# Patient Record
Sex: MALE | Race: WHITE | NOT HISPANIC OR LATINO | URBAN - METROPOLITAN AREA
[De-identification: names, ages, dates, MRNs, and addresses within clinical notes are randomized per-mention and may not be internally consistent; named-entity substitution may affect disease eponyms.]

---

## 2018-11-20 PROBLEM — Z00.00 ENCOUNTER FOR PREVENTIVE HEALTH EXAMINATION: Status: ACTIVE | Noted: 2018-11-20

## 2020-08-13 ENCOUNTER — EMERGENCY (EMERGENCY)
Facility: HOSPITAL | Age: 35
LOS: 1 days | Discharge: HOME | End: 2020-08-13
Attending: EMERGENCY MEDICINE
Payer: MEDICAID

## 2020-08-13 VITALS
HEART RATE: 104 BPM | OXYGEN SATURATION: 99 % | SYSTOLIC BLOOD PRESSURE: 135 MMHG | HEIGHT: 69 IN | WEIGHT: 240.08 LBS | RESPIRATION RATE: 18 BRPM | TEMPERATURE: 98 F | DIASTOLIC BLOOD PRESSURE: 85 MMHG

## 2020-08-13 VITALS — HEART RATE: 96 BPM

## 2020-08-13 PROCEDURE — 71045 X-RAY EXAM CHEST 1 VIEW: CPT | Mod: 26

## 2020-08-13 PROCEDURE — 93010 ELECTROCARDIOGRAM REPORT: CPT

## 2020-08-13 PROCEDURE — 99283 EMERGENCY DEPT VISIT LOW MDM: CPT

## 2020-08-13 PROCEDURE — 99284 EMERGENCY DEPT VISIT MOD MDM: CPT

## 2020-08-13 PROCEDURE — 71045 X-RAY EXAM CHEST 1 VIEW: CPT

## 2020-08-13 PROCEDURE — 93005 ELECTROCARDIOGRAM TRACING: CPT

## 2020-08-13 RX ORDER — FAMOTIDINE 10 MG/ML
20 INJECTION INTRAVENOUS DAILY
Refills: 0 | Status: DISCONTINUED | OUTPATIENT
Start: 2020-08-13 | End: 2020-08-18

## 2020-08-13 RX ADMIN — Medication 30 MILLILITER(S): at 03:08

## 2020-08-13 RX ADMIN — FAMOTIDINE 20 MILLIGRAM(S): 10 INJECTION INTRAVENOUS at 03:08

## 2020-08-13 NOTE — ED PROVIDER NOTE - PHYSICAL EXAMINATION
General: In NAD, non-toxic/well-appearing; well nourished/developed.  Skin: No rashes or lesions. Warm, dry, color normal for race.   Eyes: Sclera anicteric, conjunctivae clear b/l. No discharge. PERRLA, EOMI. No nystagmus.    Throat: Moist mucus membranes.  Neck: Supple, FROM.   Cardio: No lifts, heaves, visible pulsations. No thrills. Rate and rhythm regular, S1 & S2 clear. No audible murmur, gallop, or rub.   PV: No edema of feet/ankles. No calf swelling/tenderness. Pulses: b/l 2+ radial, DP, PT. Capillary refill <2 seconds.  Resp: Speaking in full sentences. No visible nasal flaring, retractions, or deformity. Normal AP to lateral diameter, symmetrical excursion b/l. No chest wall tenderness. Breath sounds vesicular, symmetrical and without rales, rhonchi or wheezing b/l.  Abd: Non-distended. Soft, non-tender, no masses palpated. No rebound, guarding. N  MSK/Neuro: A&Ox3. CN II-XII grossly intact. FROM. Strength 5/5 upper and lower extremities. Sensation intact to bilateral upper and lower extremities. Negative Romberg and pronator drift. Normal point-to-point test/heel-to-shin. Normal gait including tandem, heel and toe walking.

## 2020-08-13 NOTE — ED PROVIDER NOTE - ATTENDING CONTRIBUTION TO CARE
36 yo M presents to ED L arm numbness and tingling with GERD s/s which started after tasking Benadryl to help him fall asleep.  No assoc CP, SOB, N/V, diaphoresis or focal weakness.  Pt's mother with hx of MS.  On exam awake and alert in NAD, PERRL, throat clear mm moist, Neck supple, Cor Reg, Lungs clear, Abd soft, NT, Ext FROM, no edema, Neuro no focal, CN 2-12 intact, MS 5/5 b/l UE/LE's. EKG NSR.  Will re-eval after meds

## 2020-08-13 NOTE — ED PROVIDER NOTE - OBJECTIVE STATEMENT
36 yo male no PMHx presents to ED c/o left arm tingling associated with "indigestion" x 1.5 hours ago. Symptoms began while in bed after taking Benadryl to help sleep. Left arm feels "weak" and "burn." Also burning sensation. Today had chicken parm hero earlier in day and french fries this evening. Uncle with MI at age 45, mother with MS.   Denies ETOH/tobaco use, fmhx SCD, hx blood clots, recent travel, recent illness, sick contacts, COVID contacts, IVDU, fever/chills, rash, headache, chest pain, leg pain/swelling, cough, SOB, abdominal pain, n/v/d/c, back pain. 34 yo male no PMHx presents to ED c/o left arm tingling associated with "indigestion" x 1.5 hours ago. Symptoms began while in bed after taking Benadryl to help sleep. Left arm feels "weak" and "tingling." Also burning sensation to sternum. Today had chicken parm hero earlier in day and french fries this evening. Uncle with MI at age 45, mother with MS. No further complaints at this time.   Denies ETOH/tobaco use, fmhx SCD, hx blood clots, recent travel, recent illness, sick contacts, COVID contacts, IVDU, fever/chills, rash, headache, chest pain, leg pain/swelling, cough, SOB, abdominal pain, n/v/d/c, back pain. 36 yo male no PMHx presents to ED c/o left arm tingling and "indigestion" x 1.5 hours ago. Symptoms began while in bed after taking Benadryl to help sleep. Left arm feels "weak" and "tingling." Also burning sensation to sternum. Today had chicken parm hero earlier in day and french fries this evening. Uncle with MI at age 45, mother with MS. No further complaints at this time.   Denies ETOH/tobaco use, fmhx SCD, hx blood clots, recent travel, recent illness, sick contacts, COVID contacts, IVDU, fever/chills, rash, headache, chest pain, leg pain/swelling, cough, SOB, abdominal pain, n/v/d/c, back pain.

## 2020-08-13 NOTE — ED PROVIDER NOTE - NS ED ATTENDING STATEMENT MOD
I have personally performed a face to face diagnostic evaluation on this patient. I have reviewed the ACP note and agree with the history, exam and plan of care, except as noted.
Oxana Renee Karanetz

## 2020-08-13 NOTE — ED PROVIDER NOTE - NSFOLLOWUPINSTRUCTIONS_ED_ALL_ED_FT
- Over the counter Pepcid 20mg daily or two times per day.  - Please call to schedule follow up appointment with your primary care physician within 24-48 hours.  - Please seek immediate medical attention for any new/worsening, signs/symptoms, or concerns.    Feel better!

## 2020-08-13 NOTE — ED ADULT NURSE NOTE - OBJECTIVE STATEMENT
Pt alert and oriented x4, c/o "left arm tingling and burning sensation in throat" starting 1.5 hrs prior to arrival and right after taking benadryl to sleep. Pt states he has no relevant history, denies c/p, palpitations sob, cough, fevers. Pt breathing is even and unlabored, lungs clear, abd soft and non-tender. Pt neuro checks intact.

## 2020-08-13 NOTE — ED PROVIDER NOTE - CARE PROVIDER_API CALL
Kei Christianson  NEUROLOGY  [address] [place] [address]  Phone: [phone]  Fax: [phone]  Follow Up Time:     True Reeder  GASTROENTEROLOGY  [address] [place] [id number]  Phone: [phone]  Fax: [phone]  Follow Up Time:

## 2020-08-13 NOTE — ED PROVIDER NOTE - CLINICAL SUMMARY MEDICAL DECISION MAKING FREE TEXT BOX
36 yo male no PMHx presents to ED c/o left arm tingling and "indigestion" x 1.5 hours ago. No motor/sensory deficits. Indigestion improved with GI cocktail. No longer c/o arm tingling. Patient stable for discharge with appropriate follow up.

## 2020-08-13 NOTE — ED PROVIDER NOTE - PATIENT PORTAL LINK FT
You can access the FollowMyHealth Patient Portal offered by [facility] by registering at the following website: [email or web address]. By joining [employer]’s FollowMyHealth portal, you will also be able to view your health information using other applications (apps) compatible with our system.

## 2024-01-11 NOTE — ED ADULT TRIAGE NOTE - PRO INTERPRETER NEED 2
English Quality 431: Preventive Care And Screening: Unhealthy Alcohol Use - Screening: Patient not identified as an unhealthy alcohol user when screened for unhealthy alcohol use using a systematic screening method Quality 226: Preventive Care And Screening: Tobacco Use: Screening And Cessation Intervention: Patient screened for tobacco use, is a smoker AND received Cessation Counseling within measurement period or in the six months prior to the measurement period Detail Level: Detailed Quality 47: Advance Care Plan: Advance Care Planning discussed and documented; advance care plan or surrogate decision maker documented in the medical record. Quality 110: Preventive Care And Screening: Influenza Immunization: Influenza Immunization Administered during Influenza season Quality 134: Screening For Clinical Depression And Follow-Up Plan: The patient was screened for depression and the screen was negative and no follow up required

## 2024-04-29 NOTE — ED PROVIDER NOTE - NSFOLLOWUPCLINICS_GEN_ALL_ED_FT
[id number] [place] [address]  Phone: [phone]  Fax:   Follow Up Time: PAST SURGICAL HISTORY:  No significant past surgical history

## 2025-03-27 ENCOUNTER — NON-APPOINTMENT (OUTPATIENT)
Age: 40
End: 2025-03-27